# Patient Record
Sex: FEMALE | Race: BLACK OR AFRICAN AMERICAN | NOT HISPANIC OR LATINO | Employment: UNEMPLOYED | URBAN - METROPOLITAN AREA
[De-identification: names, ages, dates, MRNs, and addresses within clinical notes are randomized per-mention and may not be internally consistent; named-entity substitution may affect disease eponyms.]

---

## 2023-04-18 ENCOUNTER — HOSPITAL ENCOUNTER (OUTPATIENT)
Dept: DATA CONVERSION | Facility: HOSPITAL | Age: 23
End: 2023-04-18
Attending: INTERNAL MEDICINE | Admitting: INTERNAL MEDICINE
Payer: COMMERCIAL

## 2023-04-18 DIAGNOSIS — R14.0 ABDOMINAL DISTENSION (GASEOUS): ICD-10-CM

## 2023-05-10 LAB
AMPHETAMINE (PRESENCE) IN URINE BY SCREEN METHOD: NORMAL
BARBITURATES PRESENCE IN URINE BY SCREEN METHOD: NORMAL
BENZODIAZEPINE (PRESENCE) IN URINE BY SCREEN METHOD: NORMAL
CANNABINOIDS IN URINE BY SCREEN METHOD: NORMAL
COCAINE (PRESENCE) IN URINE BY SCREEN METHOD: NORMAL
DRUG SCREEN COMMENT URINE: NORMAL
FENTANYL URINE: NORMAL
METHADONE (PRESENCE) IN URINE BY SCREEN METHOD: NORMAL
OPIATES (PRESENCE) IN URINE BY SCREEN METHOD: NORMAL
OXYCODONE (PRESENCE) IN URINE BY SCREEN METHOD: NORMAL
PHENCYCLIDINE (PRESENCE) IN URINE BY SCREEN METHOD: NORMAL

## 2023-10-13 RX ORDER — (CALCIUM, MAGNESIUM, POTASSIUM, AND SODIUM OXYBATES) .5; .5; .5; .5 G/ML; G/ML; G/ML; G/ML
3 SOLUTION ORAL 2 TIMES DAILY
COMMUNITY
Start: 2023-09-19 | End: 2024-04-19 | Stop reason: WASHOUT

## 2023-10-13 NOTE — TELEPHONE ENCOUNTER
Kira called for xywav refill. Kira has been doing well on this medicaition with no issues. RN called verbal refill into [facility] pharmacy 3g twice nightly 30 day supply with 2 refills. RN called Kira and told her the prescription was refilled.

## 2023-10-24 PROBLEM — L70.9 ACNE: Status: ACTIVE | Noted: 2023-10-24
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
## 2023-10-24 PROBLEM — G47.411 NARCOLEPSY WITH CATAPLEXY (HHS-HCC): Status: ACTIVE | Noted: 2023-10-24

## 2023-10-24 PROBLEM — H52.13 BILATERAL MYOPIA: Status: ACTIVE | Noted: 2023-10-24

## 2023-10-24 PROBLEM — E34.31 SHORT STATURE, CONSTITUTIONAL: Status: ACTIVE | Noted: 2023-10-24

## 2023-10-24 PROBLEM — K21.9 LARYNGOPHARYNGEAL REFLUX (LPR): Status: ACTIVE | Noted: 2023-10-24

## 2023-10-24 PROBLEM — R53.83 FATIGUE: Status: ACTIVE | Noted: 2023-10-24

## 2023-10-24 PROBLEM — H35.411 LATTICE DEGENERATION OF RIGHT RETINA: Status: ACTIVE | Noted: 2023-10-24

## 2023-10-24 PROBLEM — I73.00 RAYNAUDS PHENOMENON: Status: ACTIVE | Noted: 2023-10-24

## 2023-10-24 RX ORDER — ARMODAFINIL 250 MG/1
250 TABLET ORAL DAILY
COMMUNITY
End: 2024-01-02 | Stop reason: SDUPTHER

## 2023-10-24 RX ORDER — OMEPRAZOLE 40 MG/1
1 CAPSULE, DELAYED RELEASE ORAL DAILY
COMMUNITY
Start: 2023-01-19 | End: 2024-04-19 | Stop reason: WASHOUT

## 2023-10-24 RX ORDER — ARMODAFINIL 50 MG/1
1 TABLET ORAL DAILY
COMMUNITY

## 2023-10-24 RX ORDER — METHYLPHENIDATE HYDROCHLORIDE 20 MG/1
TABLET ORAL
COMMUNITY
Start: 2023-05-10 | End: 2024-04-19 | Stop reason: WASHOUT

## 2023-10-24 RX ORDER — FLUTICASONE PROPIONATE 50 MCG
1 SPRAY, SUSPENSION (ML) NASAL DAILY
COMMUNITY
Start: 2022-12-11 | End: 2024-04-19 | Stop reason: WASHOUT

## 2023-10-25 ENCOUNTER — TELEMEDICINE (OUTPATIENT)
Dept: SLEEP MEDICINE | Facility: CLINIC | Age: 23
End: 2023-10-25
Payer: COMMERCIAL

## 2023-10-25 DIAGNOSIS — G47.411 NARCOLEPSY WITH CATAPLEXY (HHS-HCC): Primary | ICD-10-CM

## 2023-10-25 DIAGNOSIS — R53.83 OTHER FATIGUE: ICD-10-CM

## 2023-10-25 PROCEDURE — 99214 OFFICE O/P EST MOD 30 MIN: CPT | Performed by: INTERNAL MEDICINE

## 2023-10-25 RX ORDER — (CALCIUM, MAGNESIUM, POTASSIUM, AND SODIUM OXYBATES) .5; .5; .5; .5 G/ML; G/ML; G/ML; G/ML
SOLUTION ORAL
Qty: 100 ML | Refills: 2 | Status: SHIPPED | OUTPATIENT
Start: 2023-10-25

## 2023-10-25 NOTE — PROGRESS NOTES
Patient: Kira Valerio   Patient info: [id number]  : 2000 -- AGE 23 y.o.    Clinician(s): Abida Kaur DO/ Yaritza Adams MD   Service Location: [facility]   Service Date: 10/25/2023          [facility]  Sleep Medicine Clinic  Follow-up Visit Note    Virtual or Telephone Consent  An interactive audio and video telecommunication system which permits real time communications between the patient (at the originating site) and provider (at the distant site) was utilized to provide this telehealth service.   Verbal consent was requested and obtained from Kira Valerio on this date, 10/25/23 for a telehealth visit.     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 Patient was started on Armodafinil for daytime time sleepiness on 20 and stopped Ritalin due to concern for Anxiety, which has improved on Armodafenil.  Patient requesting medication regimen change due to wearing off of armodafinil by 2 PM with follow-up safety concerns with driving now that the patient is getting out of the house more. Patient is open to trying other nonstimulant medications but would like to look into them before deciding. In the past, afternoon 5 mg Ritalin dose was not noted to be very effective so it may be wiser to start with a higher dose if this is needed. Patient may be a candidate for Xywav with previous sleep fragmentation noted on sleep studies. However, declined this today.         Management:   Continue Armodafinil 250 mg  daily ( taking one dose at breakfast 8-10am) --  Prescribed 20 mg Ritalin tablets. Begin with half tablet in the afternoon and increase to full tablet if needed   - Gave patient other medications names such as Sunosi, pitolisant, and Xyrem/Xywav- she would like to read more about these and gave names on discharge paperwork along with some information on xywav  Education: described other treatments for narcolepsy.      Phone followup for how this is doing.   Followup in about 6 months        MANAGEMENT OF CONTROLLED SUBSTANCE  - OOARS: I have personally reviewed the OARRS report for this patient. I have considered the risks of abuse, dependence, addiction and diversion; patient is taking the prescribed medications as prescribed, consistent with history. No unusual activity.  - CSA (Controlled Substance Agreement) YEARLY: done today and will be scanned in the EMR   - Education on controlled medications performed, along with refill policy  - Urine Drug Screen (UDS) yearly (if 18 and older)  - Urine confirmatory testing with UDS  - Yearly in person followup is needed.        INTERIM DATA REVIEW:  Personally reviewed any raw data such as interpretation report, data sheet, hypnogram, and titration table if available and applicable  Notes and encounters in interim    CURRENT HISTORY/CONCERNS  On today's visit, the patient reports doing well on current medications with the exception of some residual sleepiness with long drives.    No episodes of cataplexy since starting medications.     Med history: Armodafinil 250mg, started Ritalin 10-20 mg in the afternoon.   Then xtarted Xyrem end of sept/early oct. Stopped Ritalin, continued armodafinil.   Xyrem helps her sleep better through the night. Takes two doses at night, no issues waking up for the second dose. Does not need to have an alarm for that second dose. In the morning feels a lot more awake, doesn't feel like she has to take the armodafinil as soon as she wakes up.  Still takes armodainil but usually around 10AM.     No side effects from Xyrem: denies any   Slight decrease in appetite, but no weight changes. No headaches, nausea, vomiting or anxiety. Did have dizziness one evening but no reoccurrence.     Happy with where things are right now. Works from home, . Still no comfortable with driving. Gets very sleepy fairly quickly. Does not drive for more than 15 minutes. Would like to be able to drive at least til 20 minutes     Patient rates the sleep problem of concern:  1- much improved  BREATHING IN SLEEP:  No problematic breathing noted in sleep; denies snoring, gasping, observed apnea or noisy breathing in sleep.    Enuresis: No     DAYTIME:  Excessive daytime sleepiness:    Sleepiness: worst time of the day in the evening  Symptoms include excessive daytime sleepiness.   For Hypnagogic/Hynopompic hallucinations if present: are not present.   Cataplexy:  Not since started medications for narcolepsy   Difficulty getting up easily in the morning: No not since starting Xyrem     School/work:    Working from home as a    Tardiness for school/missing school: No/Yes (default is no): No    Caffeine: 2-4 times a week            SLEEP ROUTINE/ ENVIRONMENT/ SLEEP-WAKE SCHEDULE   SLEEP WAKE SCHEDULE:     Weekdays/ Workdays Weekends/ Off-days   Bedtime:  (Gets into bed prepared to sleep) 10  PM   11  PM     Sleep latency (minutes) <5 minutes <5 minutes   Fall asleep time: 10:05 PM 10:05 PM   Wake/out of bed time: 8 AM 8 AM   Waking process:  On own with alarm without difficulty On own with alarm without difficulty   Refreshment from sleep refreshed refreshed   Naps: Napping info: Naps at about 12PM (time), 7/days of the week     Class/work/school schedule: M-F sets her own hours    Sleep duration:    Total estimated nocturnal sleep duration (hours): 8   24 hour sleep duration (estimated): 9 hours.        PARASOMNIA:      No problematic parasomnia  behavior    RLS/MOVEMENTS IN SLEEP:  No problematic movements in sleep/motor restlessness or other movement concerns         REVIEW OF SYSTEMS   Focused ROS:  Nocturnal NENA: No  Other GI concerns: No  Eczema/itching: No  Food intolerance: No  Mood disturbance: Yes, anxiety but stable. Better than when she was on just Ritalin alone.  Joint paints/other pains interfering with sleep: No     HISTORIES   PAST MEDICAL/ FAMILY/SOCIAL/ Environmental Hx  Reviewed in the shared medical record and by interviewing the patient/family.      Family History   Problem Relation Name Age of Onset    Lupus Mother      Sleep apnea Father      Parasomnia Brother      Uterine cancer Mother's Sister      Uterine cancer Father's Sister      Diabetes Paternal Grandmother       Medical:  has a past medical history of Narcolepsy without cataplexy (09/13/2017) and Unspecified astigmatism, bilateral (11/04/2017).     has no history on file for tobacco use, alcohol use, and drug use.   Patient lives with:  parents, sister and brother  Smoking exposure: No  Other allergen exposures: Has a dog        MEDICATIONS/ALLERGIES     No Known Allergies    Current Outpatient Medications:     armodafinil (Nuvigil) 250 mg tablet, Take 1 tablet (250 mg) by mouth once daily., Disp: , Rfl:     armodafinil (Nuvigil) 50 mg tablet, Take 1 tablet (50 mg) by mouth once daily., Disp: , Rfl:     fluticasone (Flonase) 50 mcg/actuation nasal spray, Administer 1 spray into each nostril once daily., Disp: , Rfl:     methylphenidate (Ritalin) 20 mg tablet, TAKE 1 TABLET Daily in the afternoon as directed for problem sleepiness., Disp: , Rfl:     omeprazole (PriLOSEC) 40 mg DR capsule, Take 1 capsule (40 mg) by mouth once daily., Disp: , Rfl:     Xywav 0.5 gram/mL solution, Take 3 g by mouth 2 times a day. At night, Disp: , Rfl:          PHYSICAL EXAM     Vitals/ Anthropometrics: There were no vitals taken for this visit. (Virtual)  PREVIOUS WEIGHTS:   Wt Readings from Last  3 Encounters:   05/10/23 61.9 kg (136 lb 6 oz)   01/23/23 59.4 kg (131 lb)   01/19/23 59.1 kg (130 lb 6 oz)       General: Alert, attentive in NAD   Neurologic: Language is appropriate for age, face symmetric, tongue protrusion midline.  Psychiatric: Affect appropriate, eye contact , normal behavior   Head: head shape is normal; no dysmorphic features   Eyes:  conjunctiva is noninjected;    Ears: normal external ears  Nose: airway obstruction/nasal congestion   Neck: trachea midline   Lungs: unlabored breathing   Extremities: no visible edema   Skin: no visible rash            DATA REVIEW     Lab Review:    CBC:   Lab Results   Component Value Date    WBC 7.8 01/23/2023    HGB 11.7 (L) 01/23/2023    HCT 38.2 01/23/2023    MCV 92 01/23/2023     01/23/2023    TSH:   Lab Results   Component Value Date    TSH 0.91 06/18/2019     Urine Screen:   Pain Management Panel          Latest Ref Rng & Units 5/10/2023   Pain Management Panel   Amphetamine Screen, Urine NEGATIVE PRESUMPTIVE NEGATIVE    Barbiturate Screen, Urine NEGATIVE PRESUMPTIVE NEGATIVE    Fentanyl Screen, Urine NEGATIVE PRESUMPTIVE NEGATIVE    Methadone Screen, Urine NEGATIVE PRESUMPTIVE NEGATIVE          ASSESSMENT/PLAN   Ms. Valerio is a 23 y.o. female  returning to the Pediatric Sleep Medicine Clinic for follow-up of NT1 doing much better on interim Xyrem up to 6g, tolerating well, in addition to Armodafinil    Problem List and Plan/Orders  Problem List Items Addressed This Visit       Fatigue    Narcolepsy with cataplexy - Primary    Overview     NT1(with cataplexy)  Diagnostics Jan 2016: PSG-MSLT: MSL 3.4 min and 3 SOREMPs   started Armodafinil  6/29/20 and stopped Ritalin due to concern for Anxiety, which has improved on new meds; added Xywav (previous sleep fragmentation noted on sleep studies).   On Xywav and armodafinil; +/-prn Ritalin for driving in evening.            Relevant Medications    sodium,calcium,mag,pot oxybate (Xywav) 0.5 gram/mL  solution    Other Relevant Orders    Follow Up In Pediatric Sleep Medicine     CGI:  2-much improved     Recommendations/Plan/Management:  Continue Armodafinil same dosage  Medication adjustments:  Increase Xyrem from 6g to 9g over the next 2 weeks (change from 3g twice nightly to 4.5g and 3g, then 4.5 and 4.5 g if tolerated) and reassesses sleepiness.   May still need short acting Ritalin for driving  Adequate sleep duration and appropriate timing as she is doing  Safety with driving.     Education: safety with driving, medication changes/dosages and potential side effects to watch for.   Followup: 3-6 months  Follow-up/education and other information as noted in patient instructions.  Provided team contacts for interim care and encouraged to call with questions or concerns    Abida Kaur, DO     Encounter Clinician: Yaritza Adams MD

## 2023-10-25 NOTE — PATIENT INSTRUCTIONS
[facility] Sleep Medicine  DO [address]  [address]   [place] [address]     NAME: Kira Valerio   VISIT DATE: 10/25/23    DIAGNOSIS:   1. Narcolepsy with cataplexy        2. Other fatigue          Thank you for coming to the Sleep Medicine Clinic today! Your sleep medicine doctor today was: Yaritza Adams MD  Below is a summary of your treatment plan, other important information, and our contact numbers     TREATMENT PLAN:     It was a pleasure to see you again Kira!     As discussed:     We are increasing your dose of Xywav to see if it will help with your evening sleepiness.   Currently you are taking 6mg at night (3mg and 3mg). We are going to increase it to 9mg.   Start by increasing the first nighttime dose to 4.5mg and keep the second nighttime dose the same a 3mg. Try this for one week, if this dosing is all you need you can continue on that. Otherwise you can increase the second dose to 4.5mg as well.   Please call and let us know how you do with the 9mg total dose   If you are still sleepy on the 9mg, we will then plan to refill your methylphenidate for you to take as needed   We will see you back in person in 6 months, call us if you have any questions     IMPORTANT INFORMATION:     Our offices are generally open from Monday-Friday, 8:30am - 5 pm. There are no supporting services by either the sleep doctors or their staff on weekends and Holidays, or after 5 PM on weekdays.   If you need to get in touch, you may either call the office or sleep nurse (numbers below), or you can use Guan[employer]. Please do not hesitate to call the office or sleep nurse with any questions between appointments.  If you are unable to make your appointment for clinic or an overnight study, kindly call the office at least 48 hours in advance to cancel and reschedule.  If you are on CPAP, please bring your device and mask to each clinic appointment.   If you have  "been asked to come to a sleep study, please refer to preparation and guidance on the sleep study confirmation.    If anything was ordered today (test, CPAP, referral) and you have not heard about scheduling this within 1-2 weeks, please call our office.   If you have been ordered labwork, please go to the nearest lab and we will contact you with results. If not, please call us to ensure we received your results.       PRESCRIPTIONS:  We require 7 days advanced notice for prescription refills. If we do not receive the request in this time, we cannot guarantee that your medication will be refilled in time.    FORMS:  For any school, medical forms, or other paperwork, fax to [phone] or email [email or web address]  Please allow up to 7 days for the return of any forms.     IMPORTANT PHONE NUMBERS:   Pediatric Sleep Medicine Office: [phone]   Pediatric Sleep Medicine Fax: [phone]  Appointments (for Pediatric Sleep Clinic): [phone] - option 1  or [phone] Pediatric central scheduling   Appointments (for Adult Sleep Clinic): [phone] - option 2  Appointments (For Sleep Studies): [phone] - option 3  Pediatric Sleep Nurse: [phone]  Adult Sleep Nurse: [phone] or [email or web address]  Adult Sleep Medicine Offices: P: [phone] F: [phone]       CONTACTING YOUR SLEEP MEDICINE OFFICE:  Call or email sleep nurse for refill requests or medication followup or concerns between appointments. [phone] [email or web address]  Send a message directly to your doctor through \"My Chart\", which is the email service through your  Account: [email or web address]   Call our office for any assistance with scheduling and [phone].  One of the administrative assistants will forward any other messages to your sleep medicine team.     Yaritza Adams MD    "

## 2024-01-02 DIAGNOSIS — G47.411 NARCOLEPSY WITH CATAPLEXY (HHS-HCC): ICD-10-CM

## 2024-01-02 NOTE — TELEPHONE ENCOUNTER
Patient called for refill of armodafinil 250 mg, #30 tablets for 30 days. Requested to be sent to the [facility] pharmacy at [address].

## 2024-01-03 RX ORDER — ARMODAFINIL 250 MG/1
250 TABLET ORAL DAILY
Qty: 30 TABLET | Refills: 5 | Status: SHIPPED | OUTPATIENT
Start: 2024-01-03

## 2024-04-19 ENCOUNTER — OFFICE VISIT (OUTPATIENT)
Dept: OBSTETRICS AND GYNECOLOGY | Facility: CLINIC | Age: 24
End: 2024-04-19
Payer: COMMERCIAL

## 2024-04-19 VITALS
HEIGHT: 58 IN | BODY MASS INDEX: 28.55 KG/M2 | WEIGHT: 136 LBS | SYSTOLIC BLOOD PRESSURE: 108 MMHG | DIASTOLIC BLOOD PRESSURE: 71 MMHG

## 2024-04-19 DIAGNOSIS — N89.8 VAGINAL ODOR: ICD-10-CM

## 2024-04-19 DIAGNOSIS — Z01.411 ENCOUNTER FOR GYNECOLOGICAL EXAMINATION WITH ABNORMAL FINDING: Primary | ICD-10-CM

## 2024-04-19 DIAGNOSIS — N94.6 DYSMENORRHEA: ICD-10-CM

## 2024-04-19 PROCEDURE — 87800 DETECT AGNT MULT DNA DIREC: CPT

## 2024-04-19 PROCEDURE — 1036F TOBACCO NON-USER: CPT | Performed by: OBSTETRICS & GYNECOLOGY

## 2024-04-19 PROCEDURE — 99385 PREV VISIT NEW AGE 18-39: CPT | Performed by: OBSTETRICS & GYNECOLOGY

## 2024-04-19 PROCEDURE — 87591 N.GONORRHOEAE DNA AMP PROB: CPT

## 2024-04-19 PROCEDURE — 87661 TRICHOMONAS VAGINALIS AMPLIF: CPT

## 2024-04-19 PROCEDURE — 87491 CHLMYD TRACH DNA AMP PROBE: CPT

## 2024-04-19 PROCEDURE — 88175 CYTOPATH C/V AUTO FLUID REDO: CPT

## 2024-04-19 RX ORDER — IBUPROFEN 800 MG/1
800 TABLET ORAL EVERY 8 HOURS PRN
Qty: 60 TABLET | Refills: 3 | Status: SHIPPED | OUTPATIENT
Start: 2024-04-19 | End: 2024-04-29

## 2024-04-19 RX ORDER — ALBUTEROL SULFATE 90 UG/1
2 AEROSOL, METERED RESPIRATORY (INHALATION) EVERY 6 HOURS PRN
COMMUNITY
Start: 2024-01-19

## 2024-04-19 RX ORDER — METRONIDAZOLE 500 MG/1
500 TABLET ORAL 2 TIMES DAILY
Qty: 14 TABLET | Refills: 0 | Status: SHIPPED | OUTPATIENT
Start: 2024-04-19 | End: 2024-04-26

## 2024-04-19 NOTE — PROGRESS NOTES
Subjective   Kira Valerio is a 23 y.o. female here for a routine exam.  She is a new patient to this office.  Her mother is present for the entire visit.    She has regular cycles but occasionally has clots.  She does have cramps.  She uses over-the-counter ibuprofen.    There is no dysuria, no change in bowel habits.  She also has a concern of vaginal discharge with a vaginal odor.  She is not sexually active.    Personal health questionnaire reviewed: yes.     Gynecologic History  Patient's last menstrual period was 2024 (exact date).  Contraception: none  Last Pap: n/a. Results were:  n/a .    Obstetric History  OB History    Para Term  AB Living   0 0 0 0 0 0   SAB IAB Ectopic Multiple Live Births   0 0 0 0 0       Objective   Constitutional: Alert and in no acute distress. Well developed, well nourished.   Head and Face: Head and face: Normal.    Eyes: Normal external exam - nonicteric sclera, extraocular movements intact (EOMI) and no ptosis.   Neck: No neck asymmetry. Supple. Thyroid not enlarged and there were no palpable thyroid nodules.    Pulmonary: No respiratory distress.   Chest: Breasts: Normal appearance, no nipple discharge and no skin changes. Palpation of breasts and axillae: No palpable mass and no axillary lymphadenopathy.   Abdomen: Soft nontender; no abdominal mass palpated. No organomegaly. No hernias.   Genitourinary: External genitalia: Normal. No inguinal lymphadenopathy. Bartholin's Urethral and Skenes Glands: Normal. Urethra: Normal.  Bladder: Normal on palpation. Vagina: Normal. Cervix: Normal.  Uterus: Normal.  Right Adnexa/parametria: Normal.  Left Adnexa/parametria: Normal.  Inspection of Perianal Area: Normal.   Musculoskeletal: No joint swelling seen, normal movements of all extremities.   Skin: Normal skin color and pigmentation, normal skin turgor, and no rash.   Neurologic: Non-focal. Grossly intact.   Psychiatric: Alert and oriented x 3. Affect normal to  patient baseline. Mood: Appropriate.  Physical Exam     Assessment/Plan   Healthy female exam.  This is a 23-year-old female with a normal exam.  A Pap smear was sent, including cultures for chlamydia, gonorrhea and trichomonas.    We discussed her dysmenorrhea and prescription ibuprofen 800 mg was ordered to the pharmacy.  I recommend using this every 8 hours alternating with Tylenol and heat.    Her history of vaginal discharge with odor and exam are suspicious for bacterial vaginosis.  Metronidazole tablet was ordered.    I will see her routinely in 1 year, or sooner as needed.  Education reviewed: self breast exams.

## 2024-04-23 LAB
C TRACH RRNA SPEC QL NAA+PROBE: NEGATIVE
N GONORRHOEA DNA SPEC QL PROBE+SIG AMP: NEGATIVE
T VAGINALIS RRNA SPEC QL NAA+PROBE: NEGATIVE

## 2024-04-29 LAB
CYTOLOGY CMNT CVX/VAG CYTO-IMP: NORMAL
LAB AP HPV GENOTYPE QUESTION: YES
LAB AP HPV HR: NORMAL
LAB AP PAP ADDITIONAL TESTS: NORMAL
LABORATORY COMMENT REPORT: NORMAL
LMP START DATE: NORMAL
PATH REPORT.TOTAL CANCER: NORMAL

## 2024-07-03 ENCOUNTER — TELEPHONE (OUTPATIENT)
Dept: PEDIATRIC PULMONOLOGY | Facility: HOSPITAL | Age: 24
End: 2024-07-03
Payer: COMMERCIAL

## 2024-07-03 NOTE — TELEPHONE ENCOUNTER
Date: 07/03/2024  Yaritza Adams  [address]  [place] [address]  RE: We’ve approved your request for coverage of Armodafinil 250MG OR TABS.  Dear KATHY CAMPOS:  We’re pleased to let you know that we’ve approved your or your doctor’s request for coverage  for Armodafinil 250MG OR TABS. You can now fill your prescription, and it will be covered  according to your plan.  As long as you remain covered by your prescription drug plan and there are no changes to your  plan benefits, this request is approved from 07/03/2024 to 07/03/2025. When this approval  expires, please speak to your doctor about your treatment.  Sincerely,  [facility] Caremark®  cc: Dr. Yaritza Adams  PA# [employer] [facility] [id number]   Plan Member Name: KATHY CAMPOS  Plan Member ID: [id number]  Prescriber Name: Yaritza Adams  Prescriber Phone: [phone]  Prescriber Fax: [phone]  Plan-approved criteria used for review: Nuvigil

## 2024-07-03 NOTE — TELEPHONE ENCOUNTER
Date: 07/03/2024  Yaritza Adams  [address]  [place] [address]  RE: We’ve approved your request for coverage of Armodafinil 250MG OR TABS.  Dear KATHY CAMPOS:  We’re pleased to let you know that we’ve approved your or your doctor’s request for coverage  for Armodafinil 250MG OR TABS. You can now fill your prescription, and it will be covered  according to your plan.  As long as you remain covered by your prescription drug plan and there are no changes to your  plan benefits, this request is approved from 07/03/2024 to 07/03/2025. When this approval  expires, please speak to your doctor about your treatment.  Sincerely,  [facility] Caremark®  cc: Dr. Yaritza Adams  PA# [employer] [id number]   Plan Member Name: KATHY CAMPOS  Plan Member ID: ********[id number]  Prescriber Name: Yaritza Adams  Prescriber Phone: [phone]  Prescriber Fax: [phone]  Plan-approved criteria used for review: Nuvigil      Deleted by: Clarisse Ge RN at 7/3/2024  1:17 PM

## 2024-07-03 NOTE — TELEPHONE ENCOUNTER
Date: 07/03/2024  Yaritza Adams  [address]  [place] [address]  RE: We’ve approved your request for coverage of Armodafinil 250MG OR TABS.  Dear KATHY CAMPOS:  We’re pleased to let you know that we’ve approved your or your doctor’s request for coverage  for Armodafinil 250MG OR TABS. You can now fill your prescription, and it will be covered  according to your plan.  As long as you remain covered by your prescription drug plan and there are no changes to your  plan benefits, this request is approved from 07/03/2024 to 07/03/2025. When this approval  expires, please speak to your doctor about your treatment.  Sincerely,  [facility] Caremark®  cc: Dr. Yaritza GARCIA# [employer] [id number]        Deleted by: Clarisse Ge RN at 7/3/2024  1:17 PM

## 2024-07-05 DIAGNOSIS — G47.411 NARCOLEPSY WITH CATAPLEXY (HHS-HCC): ICD-10-CM

## 2024-07-05 RX ORDER — ARMODAFINIL 250 MG/1
250 TABLET ORAL DAILY
Qty: 30 TABLET | Refills: 2 | Status: SHIPPED | OUTPATIENT
Start: 2024-07-05

## 2024-07-05 NOTE — TELEPHONE ENCOUNTER
From: Kira Valerio <[email or web address]>   Sent:  9:44 AM  To: Sleep Nurse [email or web address]>  Subject: Kira Teodoro Prescription Refill    I need a prescription refill for   Kira Valerio  : 2000  [phone]  Armodafinil 250 mg  [facility] [address] ,  [place] [address]    OARRS has been reviewed and is consistent with prescribed medications.  This RN considered the risks of abuse, dependence, addiction and diversion when managing this request for medication per protocol.  The medication is felt to be clinically appropriate based on documented diagnosis and per prescribing MD.  OARRS report to be entered into aEMR by prescribing MD at time of authorization.  According to the OARRS report as of today, the last fill of this medication was on 2024.

## 2024-07-08 DIAGNOSIS — G47.411 NARCOLEPSY WITH CATAPLEXY (HHS-HCC): ICD-10-CM

## 2024-07-08 RX ORDER — ARMODAFINIL 250 MG/1
250 TABLET ORAL DAILY
Qty: 30 TABLET | Refills: 2 | Status: SHIPPED | OUTPATIENT
Start: 2024-07-08

## 2024-10-04 DIAGNOSIS — G47.411 NARCOLEPSY WITH CATAPLEXY (HHS-HCC): ICD-10-CM

## 2024-10-14 ENCOUNTER — LAB (OUTPATIENT)
Dept: LAB | Facility: LAB | Age: 24
End: 2024-10-14
Payer: COMMERCIAL

## 2024-10-14 ENCOUNTER — APPOINTMENT (OUTPATIENT)
Dept: SLEEP MEDICINE | Facility: CLINIC | Age: 24
End: 2024-10-14
Payer: COMMERCIAL

## 2024-10-14 DIAGNOSIS — G47.411 NARCOLEPSY WITH CATAPLEXY (HHS-HCC): ICD-10-CM

## 2024-10-14 LAB
AMPHETAMINES UR QL SCN: NORMAL
BARBITURATES UR QL SCN: NORMAL
BENZODIAZ UR QL SCN: NORMAL
BZE UR QL SCN: NORMAL
CANNABINOIDS UR QL SCN: NORMAL
FENTANYL+NORFENTANYL UR QL SCN: NORMAL
METHADONE UR QL SCN: NORMAL
OPIATES UR QL SCN: NORMAL
OXYCODONE+OXYMORPHONE UR QL SCN: NORMAL
PCP UR QL SCN: NORMAL

## 2024-10-14 PROCEDURE — 80307 DRUG TEST PRSMV CHEM ANLYZR: CPT

## 2024-10-15 ENCOUNTER — TELEPHONE (OUTPATIENT)
Dept: SLEEP MEDICINE | Facility: HOSPITAL | Age: 24
End: 2024-10-15
Payer: COMMERCIAL

## 2024-10-15 DIAGNOSIS — G47.411 NARCOLEPSY WITH CATAPLEXY (HHS-HCC): ICD-10-CM

## 2024-10-15 RX ORDER — ARMODAFINIL 250 MG/1
250 TABLET ORAL DAILY
Qty: 30 TABLET | Refills: 2 | Status: SHIPPED | OUTPATIENT
Start: 2024-10-15

## 2024-10-15 NOTE — TELEPHONE ENCOUNTER
Called patient; shared negative UDS results.  Patient requested refill of armodafinil 250 mg to the [facility] in [place].      OARRS has been reviewed and is consistent with prescribed medications.  This RN considered the risks of abuse, dependence, addiction and diversion when managing this request for medication per protocol.  The medication is felt to be clinically appropriate based on documented diagnosis and per prescribing MD.  OARRS report to be entered into Epic by prescribing MD at time of authorization.  According to the OARRS report as of today, the last fill of this medication was on  09/11/2024.

## 2024-10-28 ENCOUNTER — APPOINTMENT (OUTPATIENT)
Dept: SLEEP MEDICINE | Facility: CLINIC | Age: 24
End: 2024-10-28
Payer: COMMERCIAL

## 2025-01-14 DIAGNOSIS — G47.411 NARCOLEPSY WITH CATAPLEXY (HHS-HCC): Primary | ICD-10-CM

## 2025-01-15 RX ORDER — ARMODAFINIL 250 MG/1
250 TABLET ORAL DAILY
Qty: 30 TABLET | Refills: 2 | Status: SHIPPED | OUTPATIENT
Start: 2025-01-15

## 2025-02-24 ENCOUNTER — OFFICE VISIT (OUTPATIENT)
Dept: SLEEP MEDICINE | Facility: CLINIC | Age: 25
End: 2025-02-24
Payer: COMMERCIAL

## 2025-02-24 VITALS
WEIGHT: 142.7 LBS | BODY MASS INDEX: 29.96 KG/M2 | SYSTOLIC BLOOD PRESSURE: 103 MMHG | DIASTOLIC BLOOD PRESSURE: 68 MMHG | HEART RATE: 79 BPM | HEIGHT: 58 IN | OXYGEN SATURATION: 99 %

## 2025-02-24 DIAGNOSIS — G47.411 NARCOLEPSY WITH CATAPLEXY (HHS-HCC): Primary | ICD-10-CM

## 2025-02-24 PROCEDURE — 3008F BODY MASS INDEX DOCD: CPT | Performed by: INTERNAL MEDICINE

## 2025-02-24 PROCEDURE — 1036F TOBACCO NON-USER: CPT | Performed by: INTERNAL MEDICINE

## 2025-02-24 PROCEDURE — 99214 OFFICE O/P EST MOD 30 MIN: CPT | Performed by: INTERNAL MEDICINE

## 2025-02-24 RX ORDER — (CALCIUM, MAGNESIUM, POTASSIUM, AND SODIUM OXYBATES) .5; .5; .5; .5 G/ML; G/ML; G/ML; G/ML
SOLUTION ORAL
Qty: 100 ML | Refills: 2 | Status: SHIPPED | OUTPATIENT
Start: 2025-02-24

## 2025-02-24 RX ORDER — ARMODAFINIL 250 MG/1
250 TABLET ORAL DAILY
Qty: 30 TABLET | Refills: 5 | Status: SHIPPED | OUTPATIENT
Start: 2025-02-24

## 2025-02-24 RX ORDER — (CALCIUM, MAGNESIUM, POTASSIUM, AND SODIUM OXYBATES) .5; .5; .5; .5 G/ML; G/ML; G/ML; G/ML
SOLUTION ORAL
Qty: 100 ML | Refills: 2 | Status: SHIPPED | OUTPATIENT
Start: 2025-02-24 | End: 2025-02-24 | Stop reason: SDUPTHER

## 2025-02-24 ASSESSMENT — PATIENT HEALTH QUESTIONNAIRE - PHQ9
SUM OF ALL RESPONSES TO PHQ9 QUESTIONS 1 AND 2: 0
1. LITTLE INTEREST OR PLEASURE IN DOING THINGS: NOT AT ALL
2. FEELING DOWN, DEPRESSED OR HOPELESS: NOT AT ALL

## 2025-02-24 ASSESSMENT — ENCOUNTER SYMPTOMS
LOSS OF SENSATION IN FEET: 0
DEPRESSION: 0
OCCASIONAL FEELINGS OF UNSTEADINESS: 0

## 2025-02-24 ASSESSMENT — PAIN SCALES - GENERAL: PAINLEVEL_OUTOF10: 0-NO PAIN

## 2025-02-24 ASSESSMENT — COLUMBIA-SUICIDE SEVERITY RATING SCALE - C-SSRS
1. IN THE PAST MONTH, HAVE YOU WISHED YOU WERE DEAD OR WISHED YOU COULD GO TO SLEEP AND NOT WAKE UP?: NO
6. HAVE YOU EVER DONE ANYTHING, STARTED TO DO ANYTHING, OR PREPARED TO DO ANYTHING TO END YOUR LIFE?: NO
2. HAVE YOU ACTUALLY HAD ANY THOUGHTS OF KILLING YOURSELF?: NO

## 2025-02-24 NOTE — PATIENT INSTRUCTIONS
"   [facility] Sleep Medicine  [facility] [address]  [address] [address]     NAME: Kira Valerio   VISIT DATE: 2025    Thank you for coming to the Sleep Medicine Clinic today! Your sleep medicine doctor today was: Yaritza Adams MD  Below is a summary of your treatment plan, other important information, and our contact numbers     TREATMENT PLAN:   Continue Armodafinil  Will restart the Xywav, and try to figure out the best dosage for you.   Safety     FOLLOWUP:  2-3 months        IMPORTANT PEDIATRIC PHONE NUMBERS:   Pediatric Sleep Nurse: [phone]  Pediatric Sleep Medicine Office: [phone]  Fax: [phone]  Appointments (Pediatric Central scheduling):   [phone]   Behavioral Sleep Medicine with Dr. Auguste-Zack office: [phone] (option 0 to )  Sleep phone tree for all services: [phone] - option 1 for sleep testing, option 2 for sleep office and clinic scheduling    CONTACTING YOUR SLEEP MEDICINE OFFICE:  Call or email sleep nurse for refill requests, medication followup, forms, or concerns between appointments. [phone] [email or web address]  Send a message directly to your doctor through \"My Chart\", which is the email service through your  Account: [email or web address]   Call our office for assistance with telehealth visits, scheduling and [phone].      Navigating the sleep system at :        LABS     NOTE: that  started to use "[employer] diagnostics for labs you need to ensure that [facility] is a part of your insurance coverage  You can use this website if you need it: [email or web address]    Yaritza Adams MD    "
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
## 2025-03-10 ENCOUNTER — TELEPHONE (OUTPATIENT)
Dept: SLEEP MEDICINE | Facility: HOSPITAL | Age: 25
End: 2025-03-10
Payer: COMMERCIAL

## 2025-03-10 NOTE — TELEPHONE ENCOUNTER
----- Message from Nurse Na KHAN sent at 3/10/2025  1:08 PM EDT -----  Regarding: RE: Xywav Prescription Form  Sent Yaritza owen  ----- Message -----  From: Cata Burleson RN  Sent: 3/6/2025  10:25 AM EDT  To: Peds Sleep Med Pool  Subject: RE: Xywav Prescription Form                      From: Sleep Nurse [email or web address]>  Sent:  10:24  To: Kira Valerio <[email or web address]>  Subject: Re: Xywav Prescription     Hi Kira,  Thanks for letting us know.  I believe the pharmacy is waiting on a new prescription from Dr. Adams.  The prescription requires her signature, and she was traveling this week.  Once she's back in the office (today, I believe), we will forward the script to [facility] pharmacy and that should prompt them to reach out to you.    Cata Gutierrez  ----- Message -----  From: Cata Burleson RN  Sent: 3/6/2025  10:25 AM EST  To: Peds Sleep Med Pool  Subject: RE: Xywav Prescription Form                      From: Kira Valerio <[email or web address]>  Sent:  8:54  To: Sleep Nurse [email or web address]>  Subject: Xywav Prescription     Please let Dr. Dodson know that I still have not received a call or email from [facility] for the Xywav.    - Kira Valerio   2000  ----- Message -----  From: Velma Eden RN  Sent: 3/5/2025   2:15 PM EST  To: Peds Sleep Med Pool  Subject: RE: Xywav Prescription Form                      From: Sleep Nurse <[email or web address]>  Sent: 2025 2:15 PM  To: Yaritza Adams <[email or web address]>  Subject: Kira Valerio Xywav Script     Hi Dr. Adams,     You recently saw Kira and plan to restart her on Xywav. Because it has been a year since she was on Xywav, they require a new paper prescription form with a wet signature. I've filled out the form for you to sign. I'm not sure when you are back on campus to sign, but whenever you get a chance please sign and send  back to us.     Thanks!  Zaira  ----- Message -----  From: Velma Eden RN  Sent: 3/4/2025   4:32 PM EST  To: [facility] Sleep [facility] Pool  Subject: Xywav Prescription Form                          [facility] called stating patient needs a paper prescription form with wet signature faxed in because she has been off Xywav for a year and is considered a new start. Need to fill out paper script for Dr. Adams to sign.

## 2025-03-17 ENCOUNTER — TELEPHONE (OUTPATIENT)
Dept: SLEEP MEDICINE | Facility: HOSPITAL | Age: 25
End: 2025-03-17
Payer: COMMERCIAL

## 2025-03-17 NOTE — TELEPHONE ENCOUNTER
[facility] pharmacy notified of approval. BP  Your PA request has been approved. Additional information will be provided in the approval communication. (Message 1148). Authorization Expiration Date: March 16, 2026.  ----- Message from Nurse Cata BRIDGES sent at 3/13/2025  4:17 PM EDT -----  Regarding: RE: mark YINDA BETH (Key: [id number]) (note, there was an error below when transcribing the original key, this is the correct pratt)    [facility] has not yet replied to your PA request. Depending on the information you've provided, additional questions may be returned by the plan. You may close this dialog, return to your dashboard, and perform other tasks.    To check for an update later, open this request again from your dashboard.    If [facility] has not replied to your request within 24 hours please contact [facility] at [phone].  ----- Message -----  From: Cata Burleson RN  Sent: 3/13/2025  12:33 PM EDT  To: Peds Sleep Med Pool  Subject: xywav PA                                         PA request received via fax from [employer] for Xywav, Key [id number]

## 2025-03-19 ENCOUNTER — TELEPHONE (OUTPATIENT)
Dept: SLEEP MEDICINE | Facility: HOSPITAL | Age: 25
End: 2025-03-19
Payer: COMMERCIAL

## 2025-03-19 DIAGNOSIS — G47.411 NARCOLEPSY WITH CATAPLEXY (HHS-HCC): ICD-10-CM

## 2025-03-19 RX ORDER — ARMODAFINIL 250 MG/1
250 TABLET ORAL DAILY
Qty: 30 TABLET | Refills: 0 | Status: SHIPPED | OUTPATIENT
Start: 2025-03-19

## 2025-03-19 NOTE — TELEPHONE ENCOUNTER
Ran OARRs with no concerns. Placed one month supply to [facility] for Dr. De Paz to authorize on behalf of Dr. Adams while she is out of the office.     I personally reviewed OARRS. No concerns.  Last Urine Drug Screen 10/14/24 negative.  Reji De Paz MD

## 2025-03-19 NOTE — TELEPHONE ENCOUNTER
----- Message from Nurse Velma KHAN sent at 3/19/2025 10:07 AM EDT -----  Regarding: RE: armodafinil 250 out of stock  From: Kira Valerio <[email or web address]>  Sent: Tuesday, March 18, 2025 4:18 PM  To: Sleep Nurse <[email or web address]>  Subject: Re: Armodalfinal    You can send it to the [facility] in [place]. [address] [place] [address]  ----- Message -----  From: Cata Burleson RN  Sent: 3/18/2025   3:57 PM EDT  To: [facility]  Subject: armodafinil 250 out of stock                     From: Sleep Nurse <[email or web address]>  Sent: Tuesday, March 18, 2025 14:33  To: Kira Valerio <[email or web address]>; Sleep Nurse <[email or web address]>  Subject: Re: Armodalfinal     Thanks for the explanation.  Your [facility] should be able to check stock of Armodafinil 250 mg at other [facility] pharmacies, so my assumption would be that we need to search for another pharmacy chain.    What other pharmacies are near by that you would like me to check stock at?  Cata    From: Kira Valerio <[email or web address]>  Sent: Tuesday, March 18, 2025 13:49  To: Sleep Nurse <[email or web address]>  Subject: Re: Armodalfinal     Notice - This message is from an external sender  This message came from outside of . Careful opening links or attachments.  Report Suspicious       My pharmacy ([facility] in [place]) said that they are contacting you to request for an alternative prescription. Clarisse said that Armodafinil (all dose amounts) is on a nationwide shortage.  They wouldn't have any in stock until the end of next month.  The only options available to us would be if I would want to call around to other pharmacies near you to see if any of them have the 250 mg dose in stock or ask Dr. Adams to see if she would want to switch you to an alternative.    You can try sending it to the other [facility] pharmacy near me, [address], [place] [id number]  OR  [facility] on [address],  [facility] [address]    - Kira Valerio

## 2025-03-19 NOTE — TELEPHONE ENCOUNTER
[facility] pharmacy called for clarification of Xywav prescription that was faxed over. Unable to read the second titration dose. This RN called and clarified titration schedule with pharmacist. No other questions.

## 2025-03-27 ENCOUNTER — TELEPHONE (OUTPATIENT)
Facility: CLINIC | Age: 25
End: 2025-03-27
Payer: COMMERCIAL

## 2025-03-27 NOTE — TELEPHONE ENCOUNTER
"----- Message from Nurse Cata BRIDGES sent at 3/27/2025 11:39 AM EDT -----  Regarding: asthma acknowledgment for Xywav  [facility] pharmacy called to say they need MD approval to continue Xywav knowing that patient has \"cold-air asthma\" seeing as it's a respiratory depressant.  Asthma diagnosis is not new, just new information to [facility] pharmacy.  Call back to [phone], option 3, option 4.  "

## 2025-03-27 NOTE — TELEPHONE ENCOUNTER
Reviewed chart; prn albuterol use only.  Called [facility] pharmacy, okayed dispensing.

## 2025-04-18 DIAGNOSIS — G47.411 NARCOLEPSY WITH CATAPLEXY (HHS-HCC): ICD-10-CM

## 2025-04-18 NOTE — TELEPHONE ENCOUNTER
Ran OARRs with no concerns; patient is consistent with refills last filling on 3/20/2025. UDS up to date. Patient also emailed for refills. Placed refill for Dr. Adams to authorize.

## 2025-04-22 RX ORDER — ARMODAFINIL 250 MG/1
250 TABLET ORAL DAILY
Qty: 30 TABLET | Refills: 0 | Status: SHIPPED | OUTPATIENT
Start: 2025-04-22 | End: 2025-04-24

## 2025-04-22 RX ORDER — ARMODAFINIL 250 MG/1
250 TABLET ORAL DAILY
Qty: 30 TABLET | Refills: 0 | Status: SHIPPED | OUTPATIENT
Start: 2025-04-22 | End: 2025-04-22 | Stop reason: SDUPTHER

## 2025-04-22 NOTE — TELEPHONE ENCOUNTER
Patient emailed stating refill needs to be sent to [facility]. This RN entered new script for Dr. Adams to authorize.

## 2025-04-23 ENCOUNTER — TELEPHONE (OUTPATIENT)
Dept: SLEEP MEDICINE | Facility: CLINIC | Age: 25
End: 2025-04-23
Payer: COMMERCIAL

## 2025-04-23 DIAGNOSIS — G47.411 NARCOLEPSY WITH CATAPLEXY (HHS-HCC): ICD-10-CM

## 2025-04-23 NOTE — TELEPHONE ENCOUNTER
----- Message from Nurse Cata BRIDGES sent at 4/23/2025  3:47 PM EDT -----  Regarding: RE: xywav refill  Pediatric Sleep Medicine Refill Note for Controlled SubstancesRefill request received via: email/faxMedication Name: xywavDose: 3 gm Frequency: twice nightlyPharmacy: [facility]Last visit/appt needed?: seen 2/4/25Yearly CSA on file?: Feb 2025Yearly UDS on file?: Oct 2024OARRS date of last fill: 03/27/25OARRS has been reviewed and is consistent with prescribed medications. This RN considered the risks of abuse, dependence, addiction and diversion when managing this request for medication per protocol. The medication is felt to be clinically appropriate based on documented diagnosis and per prescribing MD. OARRS report to be entered into Epic by prescribing MD at time of authorization.  ----- Message -----  From: Cata Burleson RN  Sent: 4/23/2025  10:04 AM EDT  To: Peds Sleep Med Pool  Subject: RE: xywav refill                                 Refill request also received via voicemail from [facility] pharmacy for xywav 3 gm twice nightly.  ----- Message -----  From: Cata Burleson RN  Sent: 4/23/2025   9:14 AM EDT  To: Peds Sleep Med Pool  Subject: xywav refill                                     From: Kira Valerio <[email or web address]>Sent: Wednesday, April 23, 2025 8:49To: Sleep Nurse <[email or web address]>Subject: Re: Armodalfinal Thank you! I also need a refill for the xywav too. On Apr 22, 2025, at 4:06 PM, Sleep Nurse <[email or web address]> wrote:I will resend the script to [facility]. Dr. Adams should authorize it later today or tomorrow. Thanks,Zaira

## 2025-04-24 RX ORDER — (CALCIUM, MAGNESIUM, POTASSIUM, AND SODIUM OXYBATES) .5; .5; .5; .5 G/ML; G/ML; G/ML; G/ML
SOLUTION ORAL
Qty: 360 ML | Refills: 2 | OUTPATIENT
Start: 2025-04-24

## 2025-04-24 RX ORDER — ARMODAFINIL 250 MG/1
250 TABLET ORAL DAILY
Qty: 30 TABLET | Refills: 0 | Status: SHIPPED | OUTPATIENT
Start: 2025-04-24

## 2025-05-03 ENCOUNTER — OFFICE VISIT (OUTPATIENT)
Dept: URGENT CARE | Age: 25
End: 2025-05-03
Payer: COMMERCIAL

## 2025-05-03 VITALS
DIASTOLIC BLOOD PRESSURE: 76 MMHG | BODY MASS INDEX: 29.01 KG/M2 | HEART RATE: 103 BPM | OXYGEN SATURATION: 98 % | SYSTOLIC BLOOD PRESSURE: 130 MMHG | WEIGHT: 138.8 LBS

## 2025-05-03 DIAGNOSIS — J45.901 ASTHMA WITH ACUTE EXACERBATION, UNSPECIFIED ASTHMA SEVERITY, UNSPECIFIED WHETHER PERSISTENT (HHS-HCC): ICD-10-CM

## 2025-05-03 DIAGNOSIS — R05.1 ACUTE COUGH: Primary | ICD-10-CM

## 2025-05-03 RX ORDER — METHYLPREDNISOLONE 4 MG/1
TABLET ORAL
Qty: 21 TABLET | Refills: 0 | Status: SHIPPED | OUTPATIENT
Start: 2025-05-03 | End: 2025-05-04

## 2025-05-03 RX ORDER — FLUTICASONE PROPIONATE AND SALMETEROL 250; 50 UG/1; UG/1
1 POWDER RESPIRATORY (INHALATION)
Qty: 60 EACH | Refills: 0 | Status: SHIPPED | OUTPATIENT
Start: 2025-05-03 | End: 2025-05-04

## 2025-05-03 NOTE — PROGRESS NOTES
Subjective   Patient ID: Kira Valerio is a 24 y.o. female. They present today with a chief complaint of Coughx 30 days. Has a hx of asthma and she ran out of her Wixela inh. Has an appointment with her pulm. The end of May.       Past Medical History  Allergies as of 05/03/2025    (No Known Allergies)       Prescriptions Prior to Admission[1]     Medical History[2]    Surgical History[3]     reports that she has never smoked. She has never used smokeless tobacco. She reports current alcohol use. She reports that she does not currently use drugs.    Review of Systems  Review of Systems                               Objective    Vitals:    05/03/25 1703   BP: 130/76   Pulse: 103   SpO2: 98%   Weight: 63 kg (138 lb 12.8 oz)     No LMP recorded.    Physical Exam  Vitals reviewed.   Constitutional:       General: She is not in acute distress.  HENT:      Head: Normocephalic and atraumatic.      Right Ear: Tympanic membrane and ear canal normal. No tenderness.      Left Ear: Tympanic membrane and ear canal normal. No tenderness.      Nose: Nose normal. No congestion.      Mouth/Throat:      Mouth: Mucous membranes are moist.      Pharynx: Oropharynx is clear. Uvula midline. No pharyngeal swelling, oropharyngeal exudate or posterior oropharyngeal erythema.   Eyes:      Extraocular Movements: Extraocular movements intact.      Conjunctiva/sclera: Conjunctivae normal.      Pupils: Pupils are equal, round, and reactive to light.   Cardiovascular:      Rate and Rhythm: Normal rate and regular rhythm.      Heart sounds: No murmur heard.  Pulmonary:      Effort: Pulmonary effort is normal.      Breath sounds: Normal breath sounds. No decreased breath sounds, wheezing, rhonchi or rales.   Skin:     General: Skin is warm.   Neurological:      Mental Status: She is alert and oriented to person, place, and time.   Psychiatric:         Mood and Affect: Mood normal.         Behavior: Behavior normal.             Point of Care Test &  Imaging Results from this visit  No results found for this visit on 05/03/25.   Imaging  No results found.    Cardiology, Vascular, and Other Imaging  No other imaging results found for the past 2 days      Diagnostic study results (if any) were reviewed by Gay Brennan PA-C.    Assessment/Plan   Allergies, medications, history, and pertinent labs/EKGs/Imaging reviewed by Gay Brennan PA-C.     Medical Decision Making  Pt will start on Wixela and medrol dosepak.  -         Patient is educated about their diagnoses.     -          Discussed medications benefits and adverse effects.     -          Answered all patient’s questions.     -          Patient will call 911 or go to the nearest ED if worsen symptoms .     -          Patient is agreeable to the plan of care and is deemed stable upon discharge.     -          Follow up with your primary care provider in two days.      Orders and Diagnoses  Diagnoses and all orders for this visit:  Acute cough  -     fluticasone propion-salmeteroL (Wixela Inhub) 250-50 mcg/dose diskus inhaler; Inhale 1 puff 2 times a day. Rinse mouth with water after use to reduce aftertaste and incidence of candidiasis. Do not swallow.  -     methylPREDNISolone (Medrol Dospak) 4 mg tablets; Follow schedule on package instructions  Asthma with acute exacerbation, unspecified asthma severity, unspecified whether persistent ([facility])  -     fluticasone propion-salmeteroL (Wixela Inhub) 250-50 mcg/dose diskus inhaler; Inhale 1 puff 2 times a day. Rinse mouth with water after use to reduce aftertaste and incidence of candidiasis. Do not swallow.  -     methylPREDNISolone (Medrol Dospak) 4 mg tablets; Follow schedule on package instructions      Medical Admin Record      Patient disposition: Home    Electronically signed by Gay Brennan PA-C  5:10 PM           [1] (Not in a hospital admission)   [2]   Past Medical History:  Diagnosis Date    Narcolepsy and cataplexy     Unspecified  astigmatism, bilateral 11/04/2017    Astigmatism, bilateral   [3]   Past Surgical History:  Procedure Laterality Date    HERNIA REPAIR  11/25/2014    Hernia Repair

## 2025-05-04 RX ORDER — METHYLPREDNISOLONE 4 MG/1
TABLET ORAL
Qty: 21 TABLET | Refills: 0 | Status: SHIPPED | OUTPATIENT
Start: 2025-05-04 | End: 2025-05-10

## 2025-05-04 RX ORDER — FLUTICASONE PROPIONATE AND SALMETEROL 250; 50 UG/1; UG/1
1 POWDER RESPIRATORY (INHALATION)
Qty: 60 EACH | Refills: 0 | Status: SHIPPED | OUTPATIENT
Start: 2025-05-04 | End: 2025-06-03

## 2025-05-23 DIAGNOSIS — G47.411 NARCOLEPSY WITH CATAPLEXY (HHS-HCC): ICD-10-CM

## 2025-05-23 RX ORDER — ARMODAFINIL 250 MG/1
250 TABLET ORAL DAILY
Qty: 30 TABLET | Refills: 1 | Status: SHIPPED | OUTPATIENT
Start: 2025-05-23

## 2025-05-23 NOTE — TELEPHONE ENCOUNTER
Patient emailed for refills of Armodafinil 250mg taken once daily. UDS up to date and recent office visit in February. Recommended patient call the office to scheduled virtual follow up. Ran OARRs with no concerns. Placed refills to [facility] for Dr. Adams to authorize.

## 2025-06-30 ENCOUNTER — OFFICE VISIT (OUTPATIENT)
Dept: URGENT CARE | Age: 25
End: 2025-06-30
Payer: COMMERCIAL

## 2025-06-30 VITALS
SYSTOLIC BLOOD PRESSURE: 137 MMHG | DIASTOLIC BLOOD PRESSURE: 80 MMHG | RESPIRATION RATE: 18 BRPM | TEMPERATURE: 95.8 F | HEART RATE: 93 BPM | OXYGEN SATURATION: 98 %

## 2025-06-30 DIAGNOSIS — G47.411 NARCOLEPSY WITH CATAPLEXY (HHS-HCC): ICD-10-CM

## 2025-06-30 DIAGNOSIS — H60.331 ACUTE SWIMMER'S EAR OF RIGHT SIDE: Primary | ICD-10-CM

## 2025-06-30 RX ORDER — CIPROFLOXACIN AND DEXAMETHASONE 3; 1 MG/ML; MG/ML
4 SUSPENSION/ DROPS AURICULAR (OTIC) 2 TIMES DAILY
Qty: 7.5 ML | Refills: 0 | Status: SHIPPED | OUTPATIENT
Start: 2025-06-30 | End: 2025-07-07

## 2025-06-30 ASSESSMENT — PATIENT HEALTH QUESTIONNAIRE - PHQ9
SUM OF ALL RESPONSES TO PHQ9 QUESTIONS 1 AND 2: 0
2. FEELING DOWN, DEPRESSED OR HOPELESS: NOT AT ALL
1. LITTLE INTEREST OR PLEASURE IN DOING THINGS: NOT AT ALL

## 2025-06-30 ASSESSMENT — ENCOUNTER SYMPTOMS
DEPRESSION: 0
LOSS OF SENSATION IN FEET: 0
OCCASIONAL FEELINGS OF UNSTEADINESS: 0

## 2025-06-30 ASSESSMENT — PAIN SCALES - GENERAL: PAINLEVEL_OUTOF10: 8

## 2025-06-30 NOTE — PROGRESS NOTES
Subjective   Patient ID: Kira Valerio is a 24 y.o. female. They present today with a chief complaint of Earache (Pt states she was in [place], and was in the pool and ocean, her Right is in pain, and there is substance leaking out. ).    History of Present Illness    Earache   Associated symptoms include ear discharge.       24-year-old female patient presents today with concerns of a right earache with some drainage.  She states that she just returned yesterday from an all-inclusive trip to [place], where she was swimming in the pool and in the ocean.  She states that yesterday she started to experience a throbbing pain in her right ear and overnight she noticed some purulent drainage coming from it.  She denies any other symptoms at this time.  She is here for evaluation.    Past Medical History  Allergies as of 06/30/2025    (No Known Allergies)       Prescriptions Prior to Admission[1]     Medical History[2]    Surgical History[3]     reports that she has never smoked. She has never used smokeless tobacco. She reports current alcohol use of about 3.0 standard drinks of alcohol per week. She reports that she does not currently use drugs.    Review of Systems  Review of Systems   HENT:  Positive for ear discharge and ear pain.                                   Objective    Vitals:    06/30/25 0901   BP: 137/80   BP Location: Right arm   Patient Position: Sitting   Pulse: 93   Resp: 18   Temp: 35.4 °C (95.8 °F)   TempSrc: Oral   SpO2: 98%     Patient's last menstrual period was 06/16/2025 (within days).    Physical Exam  HENT:      Right Ear: Tympanic membrane normal.      Ears:      Comments: Otitis externa in right ear; hearing WNL- purulent drainage noted.         Procedures    Point of Care Test & Imaging Results from this visit  No results found for this visit on 06/30/25.   Imaging  No results found.    Cardiology, Vascular, and Other Imaging  No other imaging results found for the past 2 days      Diagnostic  study results (if any) were reviewed by MILTON Jerez.    Assessment/Plan   Allergies, medications, history, and pertinent labs/EKGs/Imaging reviewed by MILTON Jerez.     Medical Decision Making  Otitis externa noted in the right ears; she is agreeable to antibiotic eardrops being sent to her pharmacy- medication education provided    Orders and Diagnoses  Diagnoses and all orders for this visit:  Acute swimmer's ear of right side  -     ciprofloxacin-dexamethasone (Ciprodex) otic suspension; Administer 4 drops into the right ear 2 times a day for 7 days.      Medical Admin Record      Patient disposition: Home    Electronically signed by MILTON Jerez  9:17 AM           [1] (Not in a hospital admission)   [2]   Past Medical History:  Diagnosis Date    Narcolepsy and cataplexy     Unspecified astigmatism, bilateral 11/04/2017    Astigmatism, bilateral   [3]   Past Surgical History:  Procedure Laterality Date    HERNIA REPAIR  11/25/2014    Hernia Repair

## 2025-07-06 RX ORDER — (CALCIUM, MAGNESIUM, POTASSIUM, AND SODIUM OXYBATES) .5; .5; .5; .5 G/ML; G/ML; G/ML; G/ML
SOLUTION ORAL
Qty: 360 ML | Refills: 2 | OUTPATIENT
Start: 2025-07-06

## 2025-07-07 ENCOUNTER — TELEPHONE (OUTPATIENT)
Dept: SLEEP MEDICINE | Facility: CLINIC | Age: 25
End: 2025-07-07
Payer: COMMERCIAL

## 2025-07-07 DIAGNOSIS — G47.411 NARCOLEPSY WITH CATAPLEXY (HHS-HCC): ICD-10-CM

## 2025-07-07 NOTE — TELEPHONE ENCOUNTER
----- Message from Nurse Cata BRIDGES sent at 2025  9:04 AM EDT -----  Regarding: Xywav refill  From: Kira Valerio <[email or web address]>  Sent: 2025 12:21  To: Sleep Nurse [email or web address]  Subject: Xywav Renewal     My Xywav prescription needs to be renewed with the [facility].     - Kira Valerio    2000

## 2025-07-07 NOTE — TELEPHONE ENCOUNTER
Pediatric Sleep Medicine Refill Note for Controlled Substances    Refill request received via: email  Medication Name: xywav  Dose: 3 gm  Frequency: twice nightly  Pharmacy: [facility]  Last visit/appt needed?: last 02/24/25  Yearly CSA on file?: Feb 2025  Yearly UDS on file?: Oct 2024  [place] date of last fill: 06/27/2025  OARRS has been reviewed and is consistent with prescribed medications. This RN considered the risks of abuse, dependence, addiction and diversion when managing this request for medication per protocol. The medication is felt to be clinically appropriate based on documented diagnosis and per prescribing MD. OARRS report to be entered into Epic by prescribing MD at time of authorization.

## 2025-07-08 RX ORDER — (CALCIUM, MAGNESIUM, POTASSIUM, AND SODIUM OXYBATES) .5; .5; .5; .5 G/ML; G/ML; G/ML; G/ML
SOLUTION ORAL
Qty: 360 ML | Refills: 2 | Status: SHIPPED | OUTPATIENT
Start: 2025-07-08

## 2025-08-20 ENCOUNTER — PATIENT MESSAGE (OUTPATIENT)
Dept: SLEEP MEDICINE | Facility: CLINIC | Age: 25
End: 2025-08-20
Payer: COMMERCIAL

## 2025-08-26 DIAGNOSIS — G47.411 NARCOLEPSY WITH CATAPLEXY (HHS-HCC): ICD-10-CM

## 2025-08-26 RX ORDER — ARMODAFINIL 250 MG/1
250 TABLET ORAL DAILY
Qty: 30 TABLET | Refills: 2 | Status: SHIPPED | OUTPATIENT
Start: 2025-08-26